# Patient Record
(demographics unavailable — no encounter records)

---

## 2025-07-16 NOTE — POST OP
[___ Weeks Post Op] : [unfilled] weeks post op [Doing Well] : is doing well [Excellent Pain Control] : has excellent pain control [No Sign of Infection] : is showing no signs of infection [Chills] : no chills [Fever] : no fever [Nausea] : no nausea [Vomiting] : no vomiting [de-identified] : s/p right elbow closed reduction and percutaneous pinning, DOS: 07/01/25 [de-identified] : Patient is a pleasant 2 years old male, who fell down at home, injured his right elbow, immediately experienced pain with any attempt of touching or moving his elbow. They came to [facility].  X-rays were done and displaced supracondylar was diagnosed and he was indicated for surgery. Risks and benefits were discussed at length with the family and the family elected to proceed with the surgery.  Today, the patient is being seen for follow up. He has been tolerating his long arm cast well. Denies any pain. Has not needed any pain medication. Denies any numbness or tingling. Here for further management.  [de-identified] : Right upper extremity exam:  - Long arm cast is in place and stayed on for the entirety of the exam - Cast is clean and dry  - Able to perform a thumbs up maneuver (PIN), OK sign (AIN), finger crossover (ulnar).  - Fingers are warm, pink, and moving freely.  - Brisk capillary refill in all 5 fingers.  - Sensation is intact to light touch distally. Nerve innervation of the hand is intact.  - 5/5 Strength.  [de-identified] : Xray imaging of right elbow taken, obtained, and independently reviewed today 07/15/25: Pins are in place. Alignment maintained. Evidence of early signs of interval healing.  [de-identified] : 3 yo M s/p right elbow closed reduction and percutaneous pinning [de-identified] : - Clinically, the patient is tolerating his long arm cast well. Does not have any pain. - Xray imaging of right elbow taken, obtained, and independently reviewed today 07/15/25: Pins are in place. Alignment maintained. Evidence of early signs of interval healing.  - Clinical findings and imaging were discussed at length with the family. - Recommendation at thisd time is to remain in long arm cast for one more week. - Follow up in 1 weeks for xrays right elbow OOC and pin removal.  - All concerns were addressed. Family agreed to plan and have no further questions at this time.    I, Raiza Mojica PA-C, have acted as scribe and documented the above for Dr. Negrete for this encounter.

## 2025-07-16 NOTE — END OF VISIT
[FreeTextEntry3] : I, Sergei Negrete MD, I personally performed the services described in the documentation, reviewed the documentation recorded by the scribe in my presence and it accurately and completely records my words and actions

## 2025-07-16 NOTE — POST OP
[___ Weeks Post Op] : [unfilled] weeks post op [Doing Well] : is doing well [Excellent Pain Control] : has excellent pain control [No Sign of Infection] : is showing no signs of infection [Chills] : no chills [Fever] : no fever [Nausea] : no nausea [Vomiting] : no vomiting [de-identified] : s/p right elbow closed reduction and percutaneous pinning, DOS: 07/01/25 [de-identified] : Patient is a pleasant 2 years old male, who fell down at home, injured his right elbow, immediately experienced pain with any attempt of touching or moving his elbow. They came to [facility].  X-rays were done and displaced supracondylar was diagnosed and he was indicated for surgery. Risks and benefits were discussed at length with the family and the family elected to proceed with the surgery.  Today, the patient is being seen for follow up. He has been tolerating his long arm cast well. Denies any pain. Has not needed any pain medication. Denies any numbness or tingling. Here for further management.  [de-identified] : Right upper extremity exam:  - Long arm cast is in place and stayed on for the entirety of the exam - Cast is clean and dry  - Able to perform a thumbs up maneuver (PIN), OK sign (AIN), finger crossover (ulnar).  - Fingers are warm, pink, and moving freely.  - Brisk capillary refill in all 5 fingers.  - Sensation is intact to light touch distally. Nerve innervation of the hand is intact.  - 5/5 Strength.  [de-identified] : Xray imaging of right elbow taken, obtained, and independently reviewed today 07/15/25: Pins are in place. Alignment maintained. Evidence of early signs of interval healing.  [de-identified] : 1 yo M s/p right elbow closed reduction and percutaneous pinning [de-identified] : - Clinically, the patient is tolerating his long arm cast well. Does not have any pain. - Xray imaging of right elbow taken, obtained, and independently reviewed today 07/15/25: Pins are in place. Alignment maintained. Evidence of early signs of interval healing.  - Clinical findings and imaging were discussed at length with the family. - Recommendation at thisd time is to remain in long arm cast for one more week. - Follow up in 1 weeks for xrays right elbow OOC and pin removal.  - All concerns were addressed. Family agreed to plan and have no further questions at this time.    I, Raiza Mojica PA-C, have acted as scribe and documented the above for Dr. Negrete for this encounter.

## 2025-07-22 NOTE — POST OP
[___ Weeks Post Op] : [unfilled] weeks post op [Xray (Date:___)] : [unfilled] Xray -  [Callus Formation] : callus formation [Hardware in Good Position] : hardware in good position [Good Overall Alignment] : good overall alignment [Doing Well] : is doing well [Excellent Pain Control] : has excellent pain control [No Sign of Infection] : is showing no signs of infection [Chills] : no chills [Fever] : no fever [Nausea] : no nausea [Vomiting] : no vomiting [de-identified] : s/p right elbow closed reduction and percutaneous pinning, DOS: 07/01/25 [de-identified] : Patient is a pleasant 2 years old male, who fell down at home, injured his right elbow, immediately experienced pain with any attempt of touching or moving his elbow. They came to [facility].  X-rays were done and displaced supracondylar was diagnosed and he was indicated for surgery. Risks and benefits were discussed at length with the family and the family elected to proceed with the surgery.  Today, the patient is being seen for follow up. He has been tolerating his long arm cast well. Denies any pain. Has not needed any pain medication. Denies any numbness or tingling. Here for further management. He presents today for cast removal, repeat x-rays and pin removal. [de-identified] : Right elbow: Mild stiffness at the elbow and wrist with 4/5 muscle strength secondarily due to cast immobilization. The pins are in place with no pus, malodor or signs of infection.  Neurologically intact with full sensation to palpation. 2+ pulses palpated. Skin is intact with no abrasions or sores. No deformity noted on observation. Capillary fill less than 2 seconds in all 5 digits. Resolving edema with no lymphedema. DTRs are intact. The joint appears stable with stress maneuvers. There is no discomfort with palpation over the fracture site.  [de-identified] : Xray imaging of right elbow taken, obtained, and independently reviewed today 07/22/25: Pins are in place. Alignment maintained. Moderate callus noted.  [de-identified] : 3 yo M s/p right elbow closed reduction and percutaneous pinning [de-identified] : The recommendation at this time would be to remove his pins which he tolerated very well today we did apply a clean dressing over the pin holes which he may remove in a few hours.  He may submerge his arm underwater in 24 hours and start swimming.  He is not cleared for activities.  He will start gentle range of motion exercise to promote range of motion and strength and follow-up in 3 weeks for repeat exam and x-rays at that time.  At follow up appointment order AP/lateral/oblique x-rays of right elbow x rays.   We had a thorough talk in regard to the diagnosis, prognosis and treatment modalities.  All questions and concerns were addressed today. There was a verbal understanding from the parents and patient.   CYNTHIA Card have acted as a scribe and documented the above information for Dr. Negrete.   This note was generated using Dragon medical dictation software. A reasonable effort has been made for proofreading its contents; however, typos may still remain. If there are any questions or points of clarification needed, please do not hesitate to contact my office.   The above documentation completed by the scribe is an accurate record of both my words and actions.   Dr. Negrete.